# Patient Record
Sex: FEMALE | Race: WHITE | NOT HISPANIC OR LATINO | Employment: OTHER | URBAN - METROPOLITAN AREA
[De-identification: names, ages, dates, MRNs, and addresses within clinical notes are randomized per-mention and may not be internally consistent; named-entity substitution may affect disease eponyms.]

---

## 2022-08-28 ENCOUNTER — LAB REQUISITION (OUTPATIENT)
Dept: LAB | Facility: CLINIC | Age: 67
End: 2022-08-28
Payer: COMMERCIAL

## 2022-08-28 DIAGNOSIS — D51.9 VITAMIN B12 DEFICIENCY ANEMIA, UNSPECIFIED: ICD-10-CM

## 2022-08-28 DIAGNOSIS — D64.9 ANEMIA, UNSPECIFIED: ICD-10-CM

## 2022-08-28 DIAGNOSIS — N17.9 ACUTE KIDNEY FAILURE, UNSPECIFIED (H): ICD-10-CM

## 2022-08-31 LAB
ERYTHROCYTE [DISTWIDTH] IN BLOOD BY AUTOMATED COUNT: 15.3 % (ref 10–15)
HCT VFR BLD AUTO: 34 % (ref 35–47)
HGB BLD-MCNC: 10.3 G/DL (ref 11.7–15.7)
MCH RBC QN AUTO: 30.7 PG (ref 26.5–33)
MCHC RBC AUTO-ENTMCNC: 30.3 G/DL (ref 31.5–36.5)
MCV RBC AUTO: 102 FL (ref 78–100)
PLATELET # BLD AUTO: 347 10E3/UL (ref 150–450)
RBC # BLD AUTO: 3.35 10E6/UL (ref 3.8–5.2)
WBC # BLD AUTO: 6.5 10E3/UL (ref 4–11)

## 2022-08-31 PROCEDURE — 82607 VITAMIN B-12: CPT | Mod: ORL | Performed by: EMERGENCY MEDICINE

## 2022-08-31 PROCEDURE — P9603 ONE-WAY ALLOW PRORATED MILES: HCPCS | Mod: ORL | Performed by: EMERGENCY MEDICINE

## 2022-08-31 PROCEDURE — 85027 COMPLETE CBC AUTOMATED: CPT | Mod: ORL | Performed by: EMERGENCY MEDICINE

## 2022-08-31 PROCEDURE — 80053 COMPREHEN METABOLIC PANEL: CPT | Mod: ORL | Performed by: EMERGENCY MEDICINE

## 2022-08-31 PROCEDURE — 36415 COLL VENOUS BLD VENIPUNCTURE: CPT | Mod: ORL | Performed by: EMERGENCY MEDICINE

## 2022-09-01 LAB
ALBUMIN SERPL BCG-MCNC: 3.5 G/DL (ref 3.5–5.2)
ALP SERPL-CCNC: 73 U/L (ref 35–104)
ALT SERPL W P-5'-P-CCNC: 21 U/L (ref 10–35)
ANION GAP SERPL CALCULATED.3IONS-SCNC: 9 MMOL/L (ref 7–15)
AST SERPL W P-5'-P-CCNC: 29 U/L (ref 10–35)
BILIRUB SERPL-MCNC: 0.2 MG/DL
BUN SERPL-MCNC: 12.8 MG/DL (ref 8–23)
CALCIUM SERPL-MCNC: 8.7 MG/DL (ref 8.8–10.2)
CHLORIDE SERPL-SCNC: 102 MMOL/L (ref 98–107)
CREAT SERPL-MCNC: 0.52 MG/DL (ref 0.51–0.95)
DEPRECATED HCO3 PLAS-SCNC: 31 MMOL/L (ref 22–29)
GFR SERPL CREATININE-BSD FRML MDRD: >90 ML/MIN/1.73M2
GLUCOSE SERPL-MCNC: 115 MG/DL (ref 70–99)
POTASSIUM SERPL-SCNC: 4 MMOL/L (ref 3.4–5.3)
PROT SERPL-MCNC: 5.8 G/DL (ref 6.4–8.3)
SODIUM SERPL-SCNC: 142 MMOL/L (ref 136–145)
VIT B12 SERPL-MCNC: 2843 PG/ML (ref 232–1245)

## 2022-11-21 ENCOUNTER — LAB REQUISITION (OUTPATIENT)
Dept: LAB | Facility: CLINIC | Age: 67
End: 2022-11-21
Payer: COMMERCIAL

## 2022-11-21 DIAGNOSIS — K21.9 GASTRO-ESOPHAGEAL REFLUX DISEASE WITHOUT ESOPHAGITIS: ICD-10-CM

## 2022-11-21 DIAGNOSIS — D80.7: ICD-10-CM

## 2022-11-21 DIAGNOSIS — E55.9 VITAMIN D DEFICIENCY, UNSPECIFIED: ICD-10-CM

## 2022-11-22 LAB
ALBUMIN SERPL BCG-MCNC: 3.9 G/DL (ref 3.5–5.2)
ALP SERPL-CCNC: 92 U/L (ref 35–104)
ALT SERPL W P-5'-P-CCNC: 29 U/L (ref 10–35)
ANION GAP SERPL CALCULATED.3IONS-SCNC: 15 MMOL/L (ref 7–15)
AST SERPL W P-5'-P-CCNC: 37 U/L (ref 10–35)
BASOPHILS # BLD AUTO: 0 10E3/UL (ref 0–0.2)
BASOPHILS NFR BLD AUTO: 1 %
BILIRUB SERPL-MCNC: 0.2 MG/DL
BUN SERPL-MCNC: 15.4 MG/DL (ref 8–23)
CALCIUM SERPL-MCNC: 8.8 MG/DL (ref 8.8–10.2)
CHLORIDE SERPL-SCNC: 102 MMOL/L (ref 98–107)
CREAT SERPL-MCNC: 0.57 MG/DL (ref 0.51–0.95)
DEPRECATED CALCIDIOL+CALCIFEROL SERPL-MC: 44 UG/L (ref 20–75)
DEPRECATED HCO3 PLAS-SCNC: 26 MMOL/L (ref 22–29)
EOSINOPHIL # BLD AUTO: 0.1 10E3/UL (ref 0–0.7)
EOSINOPHIL NFR BLD AUTO: 2 %
ERYTHROCYTE [DISTWIDTH] IN BLOOD BY AUTOMATED COUNT: 14.5 % (ref 10–15)
GFR SERPL CREATININE-BSD FRML MDRD: >90 ML/MIN/1.73M2
GLUCOSE SERPL-MCNC: 63 MG/DL (ref 70–99)
HCT VFR BLD AUTO: 34.9 % (ref 35–47)
HGB BLD-MCNC: 10.6 G/DL (ref 11.7–15.7)
IMM GRANULOCYTES # BLD: 0 10E3/UL
IMM GRANULOCYTES NFR BLD: 0 %
LYMPHOCYTES # BLD AUTO: 1 10E3/UL (ref 0.8–5.3)
LYMPHOCYTES NFR BLD AUTO: 22 %
MCH RBC QN AUTO: 29.9 PG (ref 26.5–33)
MCHC RBC AUTO-ENTMCNC: 30.4 G/DL (ref 31.5–36.5)
MCV RBC AUTO: 98 FL (ref 78–100)
MONOCYTES # BLD AUTO: 0.5 10E3/UL (ref 0–1.3)
MONOCYTES NFR BLD AUTO: 10 %
NEUTROPHILS # BLD AUTO: 2.9 10E3/UL (ref 1.6–8.3)
NEUTROPHILS NFR BLD AUTO: 65 %
NRBC # BLD AUTO: 0 10E3/UL
NRBC BLD AUTO-RTO: 0 /100
PLATELET # BLD AUTO: 360 10E3/UL (ref 150–450)
POTASSIUM SERPL-SCNC: 3.7 MMOL/L (ref 3.4–5.3)
PROT SERPL-MCNC: 6.1 G/DL (ref 6.4–8.3)
RBC # BLD AUTO: 3.55 10E6/UL (ref 3.8–5.2)
SODIUM SERPL-SCNC: 143 MMOL/L (ref 136–145)
VIT B12 SERPL-MCNC: 1985 PG/ML (ref 232–1245)
WBC # BLD AUTO: 4.4 10E3/UL (ref 4–11)

## 2022-11-22 PROCEDURE — 85004 AUTOMATED DIFF WBC COUNT: CPT | Performed by: INTERNAL MEDICINE

## 2022-11-22 PROCEDURE — 82435 ASSAY OF BLOOD CHLORIDE: CPT | Performed by: INTERNAL MEDICINE

## 2022-11-22 PROCEDURE — 82607 VITAMIN B-12: CPT | Performed by: INTERNAL MEDICINE

## 2022-11-22 PROCEDURE — 82947 ASSAY GLUCOSE BLOOD QUANT: CPT | Performed by: INTERNAL MEDICINE

## 2022-11-22 PROCEDURE — 82306 VITAMIN D 25 HYDROXY: CPT | Performed by: INTERNAL MEDICINE

## 2022-11-22 PROCEDURE — 36415 COLL VENOUS BLD VENIPUNCTURE: CPT | Performed by: INTERNAL MEDICINE

## 2022-11-22 PROCEDURE — P9604 ONE-WAY ALLOW PRORATED TRIP: HCPCS | Performed by: INTERNAL MEDICINE

## 2022-11-28 ENCOUNTER — LAB REQUISITION (OUTPATIENT)
Dept: LAB | Facility: CLINIC | Age: 67
End: 2022-11-28
Payer: COMMERCIAL

## 2022-11-28 DIAGNOSIS — R53.82 CHRONIC FATIGUE, UNSPECIFIED: ICD-10-CM

## 2022-11-28 DIAGNOSIS — R05.1 ACUTE COUGH: ICD-10-CM

## 2022-11-29 LAB
ALBUMIN SERPL BCG-MCNC: 3.6 G/DL (ref 3.5–5.2)
ALP SERPL-CCNC: 91 U/L (ref 35–104)
ALT SERPL W P-5'-P-CCNC: 15 U/L (ref 10–35)
ANION GAP SERPL CALCULATED.3IONS-SCNC: 8 MMOL/L (ref 7–15)
AST SERPL W P-5'-P-CCNC: 17 U/L (ref 10–35)
BASOPHILS # BLD AUTO: 0 10E3/UL (ref 0–0.2)
BASOPHILS NFR BLD AUTO: 0 %
BILIRUB SERPL-MCNC: 0.2 MG/DL
BUN SERPL-MCNC: 12.5 MG/DL (ref 8–23)
CALCIUM SERPL-MCNC: 8.4 MG/DL (ref 8.8–10.2)
CHLORIDE SERPL-SCNC: 106 MMOL/L (ref 98–107)
CREAT SERPL-MCNC: 0.52 MG/DL (ref 0.51–0.95)
DEPRECATED HCO3 PLAS-SCNC: 30 MMOL/L (ref 22–29)
EOSINOPHIL # BLD AUTO: 0.1 10E3/UL (ref 0–0.7)
EOSINOPHIL NFR BLD AUTO: 2 %
ERYTHROCYTE [DISTWIDTH] IN BLOOD BY AUTOMATED COUNT: 14.6 % (ref 10–15)
GFR SERPL CREATININE-BSD FRML MDRD: >90 ML/MIN/1.73M2
GLUCOSE SERPL-MCNC: 88 MG/DL (ref 70–99)
HCT VFR BLD AUTO: 34.2 % (ref 35–47)
HGB BLD-MCNC: 10.5 G/DL (ref 11.7–15.7)
IMM GRANULOCYTES # BLD: 0 10E3/UL
IMM GRANULOCYTES NFR BLD: 0 %
LYMPHOCYTES # BLD AUTO: 1.1 10E3/UL (ref 0.8–5.3)
LYMPHOCYTES NFR BLD AUTO: 15 %
MCH RBC QN AUTO: 29.8 PG (ref 26.5–33)
MCHC RBC AUTO-ENTMCNC: 30.7 G/DL (ref 31.5–36.5)
MCV RBC AUTO: 97 FL (ref 78–100)
MONOCYTES # BLD AUTO: 0.7 10E3/UL (ref 0–1.3)
MONOCYTES NFR BLD AUTO: 11 %
NEUTROPHILS # BLD AUTO: 5 10E3/UL (ref 1.6–8.3)
NEUTROPHILS NFR BLD AUTO: 72 %
NRBC # BLD AUTO: 0 10E3/UL
NRBC BLD AUTO-RTO: 0 /100
PLATELET # BLD AUTO: 271 10E3/UL (ref 150–450)
POTASSIUM SERPL-SCNC: 3.9 MMOL/L (ref 3.4–5.3)
PROT SERPL-MCNC: 5.8 G/DL (ref 6.4–8.3)
RBC # BLD AUTO: 3.52 10E6/UL (ref 3.8–5.2)
SODIUM SERPL-SCNC: 144 MMOL/L (ref 136–145)
WBC # BLD AUTO: 7 10E3/UL (ref 4–11)

## 2022-11-29 PROCEDURE — 82374 ASSAY BLOOD CARBON DIOXIDE: CPT | Performed by: INTERNAL MEDICINE

## 2022-11-29 PROCEDURE — P9604 ONE-WAY ALLOW PRORATED TRIP: HCPCS | Performed by: INTERNAL MEDICINE

## 2022-11-29 PROCEDURE — 36415 COLL VENOUS BLD VENIPUNCTURE: CPT | Performed by: INTERNAL MEDICINE

## 2022-11-29 PROCEDURE — 82310 ASSAY OF CALCIUM: CPT | Performed by: INTERNAL MEDICINE

## 2022-11-29 PROCEDURE — 85025 COMPLETE CBC W/AUTO DIFF WBC: CPT | Performed by: INTERNAL MEDICINE

## 2023-09-23 ENCOUNTER — HOSPITAL ENCOUNTER (EMERGENCY)
Facility: CLINIC | Age: 68
Discharge: HOME | End: 2023-09-23
Attending: EMERGENCY MEDICINE | Admitting: EMERGENCY MEDICINE
Payer: COMMERCIAL

## 2023-09-23 ENCOUNTER — APPOINTMENT (OUTPATIENT)
Dept: GENERAL RADIOLOGY | Facility: CLINIC | Age: 68
End: 2023-09-23
Attending: EMERGENCY MEDICINE
Payer: COMMERCIAL

## 2023-09-23 ENCOUNTER — APPOINTMENT (OUTPATIENT)
Dept: CT IMAGING | Facility: CLINIC | Age: 68
End: 2023-09-23
Attending: EMERGENCY MEDICINE
Payer: COMMERCIAL

## 2023-09-23 VITALS
HEART RATE: 70 BPM | OXYGEN SATURATION: 99 % | SYSTOLIC BLOOD PRESSURE: 120 MMHG | DIASTOLIC BLOOD PRESSURE: 71 MMHG | RESPIRATION RATE: 16 BRPM | TEMPERATURE: 98.3 F

## 2023-09-23 DIAGNOSIS — S81.011A KNEE LACERATION, RIGHT, INITIAL ENCOUNTER: ICD-10-CM

## 2023-09-23 DIAGNOSIS — S16.1XXA CERVICAL STRAIN, INITIAL ENCOUNTER: ICD-10-CM

## 2023-09-23 DIAGNOSIS — S09.90XA CLOSED HEAD INJURY, INITIAL ENCOUNTER: ICD-10-CM

## 2023-09-23 DIAGNOSIS — S80.01XA CONTUSION OF RIGHT KNEE, INITIAL ENCOUNTER: ICD-10-CM

## 2023-09-23 PROCEDURE — 99285 EMERGENCY DEPT VISIT HI MDM: CPT | Mod: 25

## 2023-09-23 PROCEDURE — 70450 CT HEAD/BRAIN W/O DYE: CPT

## 2023-09-23 PROCEDURE — 250N000013 HC RX MED GY IP 250 OP 250 PS 637: Performed by: EMERGENCY MEDICINE

## 2023-09-23 PROCEDURE — 72125 CT NECK SPINE W/O DYE: CPT

## 2023-09-23 PROCEDURE — 12001 RPR S/N/AX/GEN/TRNK 2.5CM/<: CPT

## 2023-09-23 PROCEDURE — 73562 X-RAY EXAM OF KNEE 3: CPT | Mod: RT

## 2023-09-23 RX ORDER — BUPIVACAINE HYDROCHLORIDE 5 MG/ML
INJECTION, SOLUTION EPIDURAL; INTRACAUDAL
Status: DISCONTINUED
Start: 2023-09-23 | End: 2023-09-23 | Stop reason: HOSPADM

## 2023-09-23 RX ORDER — ACETAMINOPHEN 325 MG/1
650 TABLET ORAL ONCE
Status: COMPLETED | OUTPATIENT
Start: 2023-09-23 | End: 2023-09-23

## 2023-09-23 RX ADMIN — ACETAMINOPHEN 650 MG: 325 TABLET, FILM COATED ORAL at 15:38

## 2023-09-23 ASSESSMENT — ACTIVITIES OF DAILY LIVING (ADL)
ADLS_ACUITY_SCORE: 35
ADLS_ACUITY_SCORE: 33

## 2023-09-23 NOTE — ED PROVIDER NOTES
History     Chief Complaint:  Fall and Knee Injury       HPI   Martha Munoz is a 67 year old female who arrives with her daughter with a chief complaint of right knee laceration and pain after a mechanical fall that occurred at 11:30 am this morning when she tripped on an uneven gap in the garage floor.  She does not think she hit her head, but she also has pain to the right parietal area of her scalp and neck pain since the fall.  She had a headache earlier which has resolved.  No LOC. No N/V.  No confusion.  No numbness/tingling or weakness.  No back pain, chest pain, arm pain or other areas of pain.  She has been able to ambulate since the fall.  She uses a walker when walking long distances, but wasn't using her walker at the time of the fall.  Last Tetanus booster was 6/29/2021 (2nd of the series of 4)      Independent Historian:   Daughter - They report the above history also    Review of External Notes:   none    Medications:    acetaminophen (TYLENOL) 325 MG tablet  albuterol (PROAIR HFA, PROVENTIL HFA, VENTOLIN HFA) 108 (90 BASE) MCG/ACT inhaler  guaiFENesin-dextromethorphan (ROBITUSSIN DM) 100-10 MG/5ML syrup      Past Medical History:    Past Medical History:   Diagnosis Date    Chronic back pain     Sleep apnea     SVT (supraventricular tachycardia) (H)        Past Surgical History:    Past Surgical History:   Procedure Laterality Date    BACK SURGERY      Neck surgery    Physical Exam   Patient Vitals for the past 24 hrs:   BP Temp Temp src Pulse Resp SpO2   09/23/23 1228 113/83 98.3  F (36.8  C) Oral 78 16 95 %        Physical Exam  Nursing note and vitals reviewed.  Constitutional:  Appears well-developed and well-nourished.   HENT:   Head:    Mild tenderness to the right parietal scalp with a small area of localized soft tissue swelling.    Mouth/Throat:   Oropharynx is clear and moist. No oropharyngeal exudate.   Eyes:    Pupils are equal, round, and reactive to light.   Neck:    No focal  tenderness or step off.  Cardiovascular:  Normal rate, regular rhythm, no murmur   Pulmonary/Chest: Breath sounds are clear and equal without wheezes or crackles.  Abdominal:   Soft. Bowel sounds are normal. Exhibits no distension and      no mass. There is no tenderness.      There is no rebound and no guarding.   Musculoskeletal:  Right le.5 cm linear jagged laceration over the anterior aspect of the knee overlying the proximal tibia.  No joint involvement.  No active bleeding.  Mild tenderness to the knee anteriorly without joint effusion. No deformity.  Ligaments intact and stable.  Remainder of the leg, hip, ankle and foot non tender.    Lymphadenopathy:  No cervical adenopathy.   Neurological:   Alert and oriented to person, place, and time. GCS 15.  CN 2-12 intact.  and proximal upper extremity strength strong and equal.  Bilateral lower extremity strength strong and equal, including strong dorsiflexion and plantarflexion strength.  Sensation intact and equal to the face, arms and legs.  No facial droop or weakness. Normal speech.  Follows commands and answers questions normally.    Skin:    Skin is warm and dry. No rash noted. No pallor.       Emergency Department Course     Imaging:  CT Cervical Spine w/o Contrast   Final Result   IMPRESSION:   HEAD CT:   1.  No CT evidence for acute intracranial process.   2.  Brain atrophy and presumed chronic microvascular ischemic changes as above.      CERVICAL SPINE CT:   1.  No CT evidence for acute fracture or post traumatic subluxation.   2.  Postsurgical changes of the cervical and thoracic spine. No hardware complication.      XR Knee Right 3 Views   Final Result   IMPRESSION: The right knee is negative for fracture. Degenerative change patellofemoral component. No effusion.      Head CT w/o contrast   Final Result   IMPRESSION:   HEAD CT:   1.  No CT evidence for acute intracranial process.   2.  Brain atrophy and presumed chronic microvascular  ischemic changes as above.      CERVICAL SPINE CT:   1.  No CT evidence for acute fracture or post traumatic subluxation.   2.  Postsurgical changes of the cervical and thoracic spine. No hardware complication.         Report per radiology    Laboratory:  Labs Ordered and Resulted from Time of ED Arrival to Time of ED Departure - No data to display     Procedures     Laceration Repair      Procedure: Laceration Repair    Indication: Laceration    Consent: Verbal    Location: Right Knee    Length: 2.5 cm    Preparation: Irrigation with Sterile Saline.and prepped with Betatine    Anesthesia/Sedation: Bupivacaine - 0.5%      Treatment/Exploration: Wound explored, no foreign bodies found     Closure: The wound was closed with one layer. Skin/superficial layer was closed with 5 x 5-0 Nylon using Interrupted sutures.     Patient Status: The patient tolerated the procedure well: Yes. There were no complications  .      Emergency Department Course & Assessments:    Interventions:  Medications   Tdap (tetanus-diphtheria-acell pertussis) (ADACEL) injection 0.5 mL (0.5 mLs Intramuscular Not Given 9/23/23 1609)   acetaminophen (TYLENOL) tablet 650 mg (650 mg Oral $Given 9/23/23 1538)        Independent Interpretation (X-rays, CTs, rhythm strip):  I reviewed her right knee xray images and do not see any fx or dislocation.    Consultations/Discussion of Management or Tests:  None        Social Determinants of Health affecting care:   None    Disposition:  The patient was discharged to home.     Impression & Plan    CMS Diagnoses: None    Medical Decision Making:  This patient sustained a mechanical fall with resulting right knee laceration and contusion as well as cervical strain and minor closed head injury..  There was no sign of knee fracture or dislocation.  I repaired the wound with sutures and she was told to have sutures removed in 12 to 13 days.  There was no sign of cervical spine fracture or intracranial bleed or skull  fracture on CT imaging of her head and neck.  She is acting appropriately and I feel she can be safely discharged to home since she can also ambulate well.  She was told signs and symptoms to return for and told to follow-up in clinic.    Diagnosis:    ICD-10-CM    1. Knee laceration, right, initial encounter  S81.011A       2. Contusion of right knee, initial encounter  S80.01XA       3. Closed head injury, initial encounter  S09.90XA       4. Cervical strain, initial encounter  S16.1XXA            Discharge Medications:  Discharge Medication List as of 9/23/2023  5:20 PM           9/23/2023   Renetta Burt MD [place], Cheri Lee, MD  09/23/23 5501

## 2023-09-23 NOTE — ED NOTES
Pt approached triage desk and reports to RN that she is unsure if she hit her head or not.  Pt c/o pain to right sided of head. No abrasion or swelling noted.

## 2023-09-23 NOTE — ED TRIAGE NOTES
Pt presents to ED ambulatory after mechanical fall around 1100 today. Pt reports she tripped between the driveway and garage floor, landed on her right knee. Denies LOC, denies hitting her head. Not on thinners. Pt c/o neck pain/stiffness. Refuses c-collar. HX: cervical fusions in 2010/2011. C/o 3/10 right knee pain. +Laceration to right knee.    Triage Assessment       Row Name 09/23/23 1220       Triage Assessment (Adult)    Airway WDL WDL       Respiratory WDL    Respiratory WDL WDL       Skin Circulation/Temperature WDL    Skin Circulation/Temperature WDL WDL       Cardiac WDL    Cardiac WDL WDL       Peripheral/Neurovascular WDL    Peripheral Neurovascular WDL WDL       Cognitive/Neuro/Behavioral WDL    Cognitive/Neuro/Behavioral WDL WDL

## 2023-10-15 ENCOUNTER — HEALTH MAINTENANCE LETTER (OUTPATIENT)
Age: 68
End: 2023-10-15

## 2024-07-20 ENCOUNTER — HEALTH MAINTENANCE LETTER (OUTPATIENT)
Age: 69
End: 2024-07-20

## 2024-08-01 ENCOUNTER — TRANSFERRED RECORDS (OUTPATIENT)
Dept: HEALTH INFORMATION MANAGEMENT | Facility: CLINIC | Age: 69
End: 2024-08-01

## 2024-10-18 ENCOUNTER — HOSPITAL ENCOUNTER (EMERGENCY)
Facility: CLINIC | Age: 69
Discharge: HOME | End: 2024-10-18
Attending: STUDENT IN AN ORGANIZED HEALTH CARE EDUCATION/TRAINING PROGRAM | Admitting: STUDENT IN AN ORGANIZED HEALTH CARE EDUCATION/TRAINING PROGRAM
Payer: COMMERCIAL

## 2024-10-18 VITALS
OXYGEN SATURATION: 100 % | DIASTOLIC BLOOD PRESSURE: 81 MMHG | BODY MASS INDEX: 31.45 KG/M2 | HEIGHT: 59 IN | WEIGHT: 156 LBS | SYSTOLIC BLOOD PRESSURE: 131 MMHG | HEART RATE: 103 BPM | TEMPERATURE: 98.6 F | RESPIRATION RATE: 16 BRPM

## 2024-10-18 DIAGNOSIS — L29.89 CHRONIC PRURITIC RASH IN ADULT: ICD-10-CM

## 2024-10-18 PROCEDURE — 99282 EMERGENCY DEPT VISIT SF MDM: CPT | Performed by: STUDENT IN AN ORGANIZED HEALTH CARE EDUCATION/TRAINING PROGRAM

## 2024-10-18 PROCEDURE — 99283 EMERGENCY DEPT VISIT LOW MDM: CPT | Performed by: STUDENT IN AN ORGANIZED HEALTH CARE EDUCATION/TRAINING PROGRAM

## 2024-10-18 ASSESSMENT — COLUMBIA-SUICIDE SEVERITY RATING SCALE - C-SSRS
1. IN THE PAST MONTH, HAVE YOU WISHED YOU WERE DEAD OR WISHED YOU COULD GO TO SLEEP AND NOT WAKE UP?: NO
6. HAVE YOU EVER DONE ANYTHING, STARTED TO DO ANYTHING, OR PREPARED TO DO ANYTHING TO END YOUR LIFE?: NO
2. HAVE YOU ACTUALLY HAD ANY THOUGHTS OF KILLING YOURSELF IN THE PAST MONTH?: NO

## 2024-10-18 ASSESSMENT — ACTIVITIES OF DAILY LIVING (ADL): ADLS_ACUITY_SCORE: 35

## 2024-10-19 NOTE — DISCHARGE INSTRUCTIONS
You were seen in the emergency department due to an ongoing rash.  There is no signs at this time of infection related to your rash but we would recommend that you follow-up with our dermatologist if you have ongoing concerns.  Please keep the DuoDERM in place, and know that the bubbling and white areas on the DuoDERM are related to a normal reaction that the DuoDERM has been open skin.  Return to the emergency department with any worsening severe symptoms, otherwise follow-up with your primary care doctor as needed

## 2024-10-19 NOTE — ED PROVIDER NOTES
"    [place] EMERGENCY DEPARTMENT ([facility])    10/18/24     History     Chief Complaint   Patient presents with    Rash     HPI  Martha Munoz is a 69 year old female who with PMH of paroxysmal SVT, primary narcolepsy, GERD, IgA, dermatitis, chronic use of opioids. Patient presents to the ED due to a rash.     As per triage note, Patient presents with chronic diffuse rash. She has been seen multiple times for this rash. She is here tonight because she \"wants answers.\" She has DuoDerm patches on the different areas.     As per Epic review:   Patient was presented to [facility] ED on 10/17/24 due to diarrhea and rash.   Labs reviewed. No significant electrolyte abnormalities. UA clear without sign of infection. CT abdomen unremarkable. ED course of GI cocktail with reported relief. Recommend Pepto-Bismol for symptom control.    In regards to rash. Patient has been seen by dermatology, recommend continuing to follow-up with dermatology. Discussed with Dr. Crisostomo, who saw patient yesterday, rash does not look infected compared to yesterday. Yesterday inflammatory markers were unremarkable. Patient appropriate for outpatient management for rash per dermatology.    Patient agreeable to plan. All questions were answered.     I discussed with Dr. Crisostomo who agrees with impression and plan. [AR]       Past Medical History  Past Medical History:   Diagnosis Date    Chronic back pain     Sleep apnea     SVT (supraventricular tachycardia) (H)      Past Surgical History:   Procedure Laterality Date    BACK SURGERY       acetaminophen (TYLENOL) 325 MG tablet  albuterol (PROAIR HFA, PROVENTIL HFA, VENTOLIN HFA) 108 (90 BASE) MCG/ACT inhaler  guaiFENesin-dextromethorphan (ROBITUSSIN DM) 100-10 MG/5ML syrup  ibuprofen (ADVIL,MOTRIN) 200 MG tablet  oseltamivir (TAMIFLU) 75 MG capsule      Allergies   Allergen Reactions    Botox [Onabotulinumtoxina] Other (See Comments)     \"lost all muscle " "control\"    Percocet [Oxycodone-Acetaminophen] Nausea and Vomiting     Family History  No family history on file.  Social History   Social History     Tobacco Use    Smoking status: Former   Substance Use Topics    Alcohol use: No    Drug use: No      Past medical history, past surgical history, medications, allergies, family history, and social history were reviewed with the patient. No additional pertinent items.   A complete review of systems was performed with pertinent positives and negatives noted in the HPI, and all other systems negative.    Physical Exam   BP: 131/81  Pulse: 103  Temp: 98.6  F (37  C)  Resp: 16  Height: 149.9 cm (4' 11\")  Weight: 70.8 kg (156 lb)  SpO2: 100 %  Physical Exam  GEN: Well appearing, non toxic, cooperative  HEENT: normocephalic and atraumatic, PERRLA, EOMI  CV: well-perfused, normal skin color for ethnicity  PULM: breathing comfortably, in no respiratory distress  ABD: nondistended  EXT: Full range of motion.  No edema.  NEURO: awake, conversant, grossly normal bilateral upper and lower extremity strength & ROM   SKIN: No rashes, ecchym DuoDERM present on bilateral arms and legs with a couple of areas of white bubbling under the DuoDERM.  One of them was removed, demonstrated normal healing open follow-up with palpable superficial wound without any pus, erythema or warmth.  PSYCH: Calm and cooperative, interactive      ED Course, Procedures, & Data      Procedures          No results found for any visits on 10/18/24.  Medications - No data to display  Labs Ordered and Resulted from Time of ED Arrival to Time of ED Departure - No data to display  No orders to display          Critical care was not performed.     Medical Decision Making  The patient's presentation was of moderate complexity (a chronic illness mild to moderate exacerbation, progression, or side effect of treatment).    The patient's evaluation involved:  review of external note(s) from 3+ sources (see separate area " of note for details)  review of 3+ test result(s) ordered prior to this encounter (see separate area of note for details)  strong consideration of a test (see separate area of note for details) that was ultimately deferred    The patient's management necessitated only low risk treatment.    Assessment & Plan    69-year-old woman with past medical history of of 6-month rash she is seeing dermatology for in the setting of excoriations evaluated by dermatology from 8/1 and given some steroid creams that she states are not improving, has been wearing DuoDERM over the last couple of days and was concerned as she started noticing some white bubbling areas all over multiple of her scabs but otherwise without any significant changes.  She was seen in the ER yesterday, and comes for a second opinion at the request of the dermatology PA/nurse at the last dermatology she was out.    Nothing acute appearing on her evaluation today.  All of her wounds seem to be related to some superficial excoriations, and the white bubbling is typical expected changes that are related to the DuoDERM she is wearing.  I will give her her dermatology referral, however more consistent at this time with a pruritic rash and now that it is covered, she states that they are actually improving and getting smaller    I have reviewed the nursing notes. I have reviewed the findings, diagnosis, plan and need for follow up with the patient.    Discharge Medication List as of 10/18/2024 11:46 PM          Final diagnoses:   Chronic pruritic rash in adult       Diana Evans MD  [facility] EMERGENCY DEPARTMENT  10/18/2024     Diana Evans MD  10/18/24 8162

## 2024-10-19 NOTE — ED TRIAGE NOTES
"Patient presents with chronic diffuse rash. She has been seen multiple times for this rash. She is here tonight because she \"wants answers.\" She has DuoDerm patches on the different areas.      Triage Assessment (Adult)       Row Name 10/18/24 5138          Triage Assessment    Airway WDL WDL        Respiratory WDL    Respiratory WDL WDL        Skin Circulation/Temperature WDL    Skin Circulation/Temperature WDL WDL        Cardiac WDL    Cardiac WDL WDL        Peripheral/Neurovascular WDL    Peripheral Neurovascular WDL WDL        Cognitive/Neuro/Behavioral WDL    Cognitive/Neuro/Behavioral WDL WDL                     "

## 2024-10-21 ENCOUNTER — TELEPHONE (OUTPATIENT)
Dept: DERMATOLOGY | Facility: CLINIC | Age: 69
End: 2024-10-21
Payer: COMMERCIAL

## 2024-10-21 NOTE — TELEPHONE ENCOUNTER
[facility] Call Center    Phone Message    May a detailed message be left on voicemail: no     Reason for Call: Appointment Intake    Referring Provider Name: Diana Evans MD @ [facility] Emergency Department  Diagnosis and/or Symptoms: Chronic pruritic rash in adult [L29.89]  Priority: 1-2 Weeks  HFU, routing to clinic per protocols    Action Taken: Message routed to:  [facility]    Travel Screening: Not Applicable     Date of Service: ED 10/18/24

## 2024-10-23 ENCOUNTER — TELEPHONE (OUTPATIENT)
Dept: DERMATOLOGY | Facility: CLINIC | Age: 69
End: 2024-10-23
Payer: COMMERCIAL

## 2024-11-20 ENCOUNTER — HOSPITAL ENCOUNTER (EMERGENCY)
Facility: CLINIC | Age: 69
Discharge: HOME | End: 2024-11-20
Attending: EMERGENCY MEDICINE | Admitting: EMERGENCY MEDICINE
Payer: COMMERCIAL

## 2024-11-20 VITALS
SYSTOLIC BLOOD PRESSURE: 130 MMHG | RESPIRATION RATE: 8 BRPM | TEMPERATURE: 98.7 F | HEART RATE: 70 BPM | OXYGEN SATURATION: 93 % | DIASTOLIC BLOOD PRESSURE: 84 MMHG

## 2024-11-20 DIAGNOSIS — S09.90XA HEAD INJURY, INITIAL ENCOUNTER: ICD-10-CM

## 2024-11-20 PROCEDURE — 99283 EMERGENCY DEPT VISIT LOW MDM: CPT

## 2024-11-20 PROCEDURE — 93005 ELECTROCARDIOGRAM TRACING: CPT

## 2024-11-20 PROCEDURE — 250N000013 HC RX MED GY IP 250 OP 250 PS 637: Performed by: EMERGENCY MEDICINE

## 2024-11-20 RX ORDER — ACETAMINOPHEN 500 MG
1000 TABLET ORAL ONCE
Status: COMPLETED | OUTPATIENT
Start: 2024-11-20 | End: 2024-11-20

## 2024-11-20 RX ADMIN — ACETAMINOPHEN 1000 MG: 500 TABLET, FILM COATED ORAL at 22:33

## 2024-11-20 ASSESSMENT — COLUMBIA-SUICIDE SEVERITY RATING SCALE - C-SSRS
6. HAVE YOU EVER DONE ANYTHING, STARTED TO DO ANYTHING, OR PREPARED TO DO ANYTHING TO END YOUR LIFE?: NO
1. IN THE PAST MONTH, HAVE YOU WISHED YOU WERE DEAD OR WISHED YOU COULD GO TO SLEEP AND NOT WAKE UP?: NO
2. HAVE YOU ACTUALLY HAD ANY THOUGHTS OF KILLING YOURSELF IN THE PAST MONTH?: NO

## 2024-11-20 ASSESSMENT — ACTIVITIES OF DAILY LIVING (ADL)
ADLS_ACUITY_SCORE: 0

## 2024-11-21 LAB
ATRIAL RATE - MUSE: 81 BPM
DIASTOLIC BLOOD PRESSURE - MUSE: NORMAL MMHG
INTERPRETATION ECG - MUSE: NORMAL
P AXIS - MUSE: 62 DEGREES
PR INTERVAL - MUSE: 168 MS
QRS DURATION - MUSE: 104 MS
QT - MUSE: 344 MS
QTC - MUSE: 399 MS
R AXIS - MUSE: -45 DEGREES
SYSTOLIC BLOOD PRESSURE - MUSE: NORMAL MMHG
T AXIS - MUSE: -12 DEGREES
VENTRICULAR RATE- MUSE: 81 BPM

## 2024-11-21 NOTE — ED PROVIDER NOTES
Emergency Department Note      History of Present Illness     Chief Complaint   Fall      HPI   Martha Munoz is a 69 year old female who is not anticoagulated. Patient notes that she was reaching for a coat, took a step backwards, and hit posterior head into a door frame and sustained head trauma. She endorses minimal nausea. She denies vomiting. She denies loss of consciousness from the fall. She denies difficulty ambulating or any other neurologic deficits.    Independent Historian       Review of External Notes       Past Medical History     Medical History and Problem List   Hyperlipidemia   Hypertension   Coronary artery disease   SVT  Spinal stenosis in lumbar and cervical regions    Medications   Dolophine  Atarax  Lyrica  Wellbutrin  Pravachol  Toprol  Cardizem  Provigil  Flovent  Albuterol      Surgical History   Past Surgical History:   Procedure Laterality Date    BACK SURGERY         Physical Exam     Patient Vitals for the past 24 hrs:   BP Temp Temp src Pulse Resp SpO2   11/20/24 2140 -- -- -- -- -- 94 %   11/20/24 2138 -- -- -- -- -- 92 %   11/20/24 2137 -- -- -- -- -- 93 %   11/20/24 2135 -- -- -- -- -- 94 %   11/20/24 2134 -- -- -- -- -- 94 %   11/20/24 2133 -- -- -- -- -- 94 %   11/20/24 2130 130/73 -- -- 64 -- 94 %   11/20/24 2115 139/85 -- -- -- -- --   11/20/24 2100 132/83 -- -- 70 (!) 8 95 %   11/20/24 2045 123/78 -- -- 74 14 93 %   11/20/24 2030 126/75 -- -- 69 11 93 %   11/20/24 2015 131/89 -- -- 70 15 95 %   11/20/24 2000 131/82 -- -- 73 12 96 %   11/20/24 1945 131/87 -- -- 80 16 97 %   11/20/24 1935 (!) 148/95 98.7  F (37.1  C) Oral 83 18 96 %     Physical Exam  General: Patient is well appearing. No distress.  Head to toe trauma normal.    Head: Atraumatic. No chitra bruise laceration.    Eyes: Conjunctivae and EOM are normal. No scleral icterus.  Neck: Normal range of motion. Neck supple. No midline tenderness.    Cardiovascular: Normal rate, regular rhythm, normal heart sounds and  intact distal pulses.   Pulmonary/Chest: Breath sounds normal. No respiratory distress.  Abdominal: Soft. Bowel sounds are normal. No distension. No tenderness. No rebound or guarding.   Musculoskeletal: Normal range of motion.  Skin: Warm and dry. No rash noted. Not diaphoretic.     Diagnostics     Lab Results   Labs Ordered and Resulted from Time of ED Arrival to Time of ED Departure - No data to display    Imaging   CT Head w/o Contrast   Final Result   IMPRESSION:   1.  No acute intracranial process.   2.  Stable chronic changes as above.          EKG   ECG results from 11/20/24   EKG 12-lead, tracing only     Value    Systolic Blood Pressure     Diastolic Blood Pressure     Ventricular Rate 81    Atrial Rate 81    KS Interval 168    QRS Duration 104        QTc 399    P Axis 62    R AXIS -45    T Axis -12    Interpretation ECG      Sinus rhythm with Premature atrial complexes  Incomplete right bundle branch block  Left anterior fascicular block  Nonspecific ST and T wave abnormality  When compared with ECG of 24-Feb-2016 01:59,  Premature atrial complexes are now Present  QT has shortened         Independent Interpretation   None    ED Course      Medications Administered   Medications - No data to display    Procedures   Procedures     Discussion of Management   None    ED Course   ED Course as of 11/20/24 2144 Wed Nov 20, 2024 2014 I obtained history and examined the patient as noted above       Additional Documentation  None    Medical Decision Making / Diagnosis         MDM     Martha Munoz is a 69 year old female who presents for evaluation of a fall.  This was clearly a mechanical fall, not syncope.  There were no prodromal symptoms so I doubt stroke, cardiac arrhythmia or other serious etiology.  Detailed exam shows .  Based on this I did not do basic blood work and urinalysis but I did do a CT head due to the patient's request.  I discussed no red flags and all scoring negative.   She tells me  that one time at [place] a doctor told her that on time in one patient they barely hit their head and had a head bleed so she will not go home without a CT.  Results as above.  I had the tech ambulate the patient and she did well.  Based on this I would send home for outpatient management.  I would not do workup for syncope, ACS, PE at this point.  I doubt serious underlying fractures, intracerebral issues, spinal fractures.      All questions and concerns were answered. The patient was discharged home and recommended to follow up with her primary physician and return with any new or worsening symptoms.     Disposition   The patient was discharged.     Diagnosis     ICD-10-CM    1. Head injury, initial encounter  S09.90XA            Discharge Medications   New Prescriptions    No medications on file         Scribe Disclosure:  I, Mariel Burks, am serving as a scribe at 8:07 PM on 11/20/2024 to document services personally performed by Jefferson Solis MD based on my observations and the provider's statements to me.        Jefferson Solis MD  11/20/24 0795

## 2024-11-21 NOTE — ED TRIAGE NOTES
Pt comes via EMS for a fall, states she hit head on wooden closet. Denies LOC or blood thinner use. ABC's intact, alert and oriented x3.

## 2024-11-21 NOTE — ED NOTES
Bed: ED10  Expected date:   Expected time:   Means of arrival:   Comments:  [facility]-70 y/o F-Room 10